# Patient Record
Sex: FEMALE | Race: BLACK OR AFRICAN AMERICAN | Employment: OTHER | URBAN - METROPOLITAN AREA
[De-identification: names, ages, dates, MRNs, and addresses within clinical notes are randomized per-mention and may not be internally consistent; named-entity substitution may affect disease eponyms.]

---

## 2018-01-26 ENCOUNTER — OFFICE VISIT (OUTPATIENT)
Dept: NEUROLOGY | Age: 83
End: 2018-01-26

## 2018-01-26 VITALS — DIASTOLIC BLOOD PRESSURE: 55 MMHG | SYSTOLIC BLOOD PRESSURE: 90 MMHG | OXYGEN SATURATION: 97 % | HEART RATE: 61 BPM

## 2018-01-26 DIAGNOSIS — R53.1 LEFT-SIDED WEAKNESS: ICD-10-CM

## 2018-01-26 DIAGNOSIS — G93.40 ENCEPHALOPATHY ACUTE: Primary | ICD-10-CM

## 2018-01-26 DIAGNOSIS — G93.40 ENCEPHALOPATHY ACUTE: ICD-10-CM

## 2018-01-26 DIAGNOSIS — R41.3 MEMORY CHANGES: ICD-10-CM

## 2018-01-26 RX ORDER — OMEPRAZOLE 20 MG/1
CAPSULE, DELAYED RELEASE ORAL
Refills: 1 | COMMUNITY
Start: 2017-12-27

## 2018-01-26 RX ORDER — PRAVASTATIN SODIUM 20 MG/1
TABLET ORAL
Refills: 0 | COMMUNITY
Start: 2017-12-27

## 2018-01-26 RX ORDER — AMLODIPINE BESYLATE 10 MG/1
TABLET ORAL
Refills: 0 | COMMUNITY
Start: 2017-12-27

## 2018-01-26 RX ORDER — CARVEDILOL 25 MG/1
TABLET ORAL
Refills: 0 | COMMUNITY
Start: 2017-12-27

## 2018-01-26 RX ORDER — ASPIRIN 325 MG
325 TABLET ORAL DAILY
COMMUNITY

## 2018-01-26 RX ORDER — VALSARTAN 160 MG/1
TABLET ORAL
Refills: 0 | COMMUNITY
Start: 2018-01-11

## 2018-01-26 RX ORDER — CILOSTAZOL 50 MG/1
TABLET ORAL
Refills: 0 | COMMUNITY
Start: 2017-12-28

## 2018-01-26 RX ORDER — METFORMIN HYDROCHLORIDE 500 MG/1
TABLET ORAL
Refills: 0 | COMMUNITY
Start: 2017-12-27

## 2018-01-26 NOTE — MR AVS SNAPSHOT
[address] [place] [address] [address] [facility] [address] [place] [id number] [phone] Patient: Leonor MRN: [id number] WFL:[id number] Visit Information Date & Time Provider Department Dept. Phone Encounter #  
 1/26/2018  2:00 PM MD Eli Owusu Neurology [facility] [phone] [id number] Upcoming Health Maintenance Date Due DTaP/Tdap/Td series (1 - Tdap) 9/18/1949 ZOSTER VACCINE AGE 60> 7/18/1988 GLAUCOMA SCREENING Q2Y 9/18/1993 OSTEOPOROSIS SCREENING (DEXA) 9/18/1993 Pneumococcal 65+ Low/Medium Risk (1 of 2 - PCV13) 9/18/1993 MEDICARE YEARLY EXAM 9/18/1993 Influenza Age 5 to Adult 8/1/2017 Allergies as of 1/26/2018  Review Complete On: 1/26/2018 By: Adriane Salomon No Known Allergies Current Immunizations  Never Reviewed No immunizations on file. Not reviewed this visit You Were Diagnosed With   
  
 Codes Comments Encephalopathy acute    -  Primary ICD-10-CM: G93.40 ICD-9-CM: 348.30 Left-sided weakness     ICD-10-CM: R53.1 ICD-9-CM: 728.87 Memory changes     ICD-10-CM: R41.3 ICD-9-CM: 780.93 Vitals BP Pulse SpO2 Smoking Status 90/55 61 97% Never Smoker Your Updated Medication List  
  
   
This list is accurate as of: 1/26/18  2:46 PM.  Always use your most recent med list. amLODIPine 10 mg tablet Commonly known as:  NORVASC  
  
 aspirin 325 mg tablet Commonly known as:  ASPIRIN Take 325 mg by mouth daily. carvedilol 25 mg tablet Commonly known as:  COREG  
  
 cilostazol 50 mg tablet Commonly known as:  PLETAL  
take 1 tablet by mouth 30 MINUTES BEFORE OR 2 HOURS AFTER BREAKFA. ..  (REFER TO PRESCRIPTION NOTES). metFORMIN 500 mg tablet Commonly known as:  GLUCOPHAGE  
  
 omeprazole 20 mg capsule Commonly known as:  PRILOSEC  
  
 pravastatin 20 mg tablet Commonly known as:  PRAVACHOL  
  
 valsartan 160 mg tablet Commonly known as:  DIOVAN  
take 1 tablet by mouth once daily We Performed the Following VITAMIN B12 & FOLATE [01644 CPT(R)] To-Do List   
 01/27/2018 Imaging:  DUPLEX CAROTID BILATERAL AMB NEURO   
  
 01/27/2018 Neurology:  EEG   
  
 01/27/2018 Imaging:  MRI BRAIN WO CONT   
  
 01/27/2018 Lab:  TSH 3RD GENERATION Patient Instructions [address] What is a living will? A living will is a legal form you use to write down the kind of care you want at the end of your life. It is used by the health professionals who will treat you if you aren't able to decide for yourself. If you put your wishes in writing, your loved ones and others will know what kind of care you want. They won't need to guess. This can ease your mind and be helpful to others. A living will is not the same as an estate or property will. An estate will explains what you want to happen with your money and property after you die. Is a living will a legal document? A living will is a legal document. Each state has its own laws about living garcia. If you move to another state, make sure that your living will is legal in the state where you now live. Or you might use a universal form that has been approved by many states. This kind of form can sometimes be completed and stored online. Your electronic copy will then be available wherever you have a connection to the Internet. In most cases, doctors will respect your wishes even if you have a form from a different state. · You don't need an  to complete a living will. But legal advice can be helpful if your state's laws are unclear, your health history is complicated, or your family can't agree on what should be in your living will. · You can change your living will at any time. Some people find that their wishes about end-of-life care change as their health changes. · In addition to making a living will, think about completing a medical power of  form. This form lets you name the person you want to make end-of-life treatment decisions for you (your \"health care agent\") if you're not able to. Many hospitals and nursing homes will give you the forms you need to complete a living will and a medical power of . · Your living will is used only if you can't make or communicate decisions for yourself anymore. If you become able to make decisions again, you can accept or refuse any treatment, no matter what you wrote in your living will. · Your state may offer an online registry. This is a place where you can store your living will online so the doctors and nurses who need to treat you can find it right away. What should you think about when creating a living will? Talk about your end-of-life wishes with your family members and your doctor. Let them know what you want. That way the people making decisions for you won't be surprised by your choices. Think about these questions as you make your living will: · Do you know enough about life support methods that might be used? If not, talk to your doctor so you know what might be done if you can't breathe on your own, your heart stops, or you're unable to swallow. · What things would you still want to be able to do after you receive life-support methods? Would you want to be able to walk? To speak? To eat on your own? To live without the help of machines? · If you have a choice, where do you want to be cared for? In your home? At a hospital or nursing home? · Do you want certain Islam practices performed if you become very ill? · If you have a choice at the end of your life, where would you prefer to die? At home? In a hospital or nursing home? Somewhere else? · Would you prefer to be buried or cremated? · Do you want your organs to be donated after you die? What should you do with your living will? · Make sure that your family members and your health care agent have copies of your living will. · Give your doctor a copy of your living will to keep in your medical record. If you have more than one doctor, make sure that each one has a copy. · You may want to put a copy of your living will where it can be easily found. Where can you learn more? Go to http://pepe-irene.info/. Enter N917 in the search box to learn more about \"Learning About Living Mark Santiago. \" Current as of: September 24, 2016 Content Version: 11.4 © 1964-1363 [employer]. Care instructions adapted under license by [employer] (which disclaims liability or warranty for this information). If you have questions about a medical condition or this instruction, always ask your healthcare professional. [address] any warranty or liability for your use of this information. PRESCRIPTION REFILL POLICY [facility] Statement to Patients April 1, 2014 In an effort to ensure the large volume of patient prescription refills is processed in the most efficient and expeditious manner, we are asking our patients to assist us by calling your Pharmacy for all prescription refills, this will include also your  Mail Order Pharmacy. The pharmacy will contact our office electronically to continue the refill process. Please do not wait until the last minute to call your pharmacy. We need at least 48 hours (2days) to fill prescriptions. We also encourage you to call your pharmacy before going to  your prescription to make sure it is ready. With regard to controlled substance prescription refill requests (narcotic refills) that need to be picked up at our office, we ask your cooperation by providing us with at least 72 hours (3days) notice that you will need a refill.  
 
We will not refill narcotic prescription refill requests after 4:00pm on any weekday, Monday through Thursday, or after 2:00pm on Fridays, or on the weekends. We encourage everyone to explore another way of getting your prescription refill request processed using LiveLeaf, our patient web portal through our electronic medical record system. Truevisiont is an efficient and effective way to communicate your medication request directly to the office and  downloadable as an jimbo on your smart phone . LiveLeaf also features a review functionality that allows you to view your medication list as well as leave messages for your physician. Are you ready to get connected? If so please review the attatched instructions or speak to any of our staff to get you set up right away! Thank you so much for your cooperation. Should you have any questions please contact our Practice Administrator. The Physicians and Staff,  [facility] If we have ordered testing for you, we typically do not call patients with results. Your doctor or nurse will contact you if there are critical results that need to be addressed before your next appointment. We also schedule follow up appointments so that your results can be discussed in person and any questions you have regarding them may be addressed. Additionally, results may be found by using the My Chart feature and one of our patient service representatives at the  can give you instructions on how to access this feature of our electronic medical record system. Introducing [facility]! [employer] introduces LiveLeaf patient portal. Now you can access parts of your medical record, email your doctor's office, and request medication refills online. 1. In your internet browser, go to [email or web address] 2. Click on the First Time User? Click Here link in the Sign In box. You will see the New Member Sign Up page. 3. Enter your LiveLeaf Access Code exactly as it appears below.  You will not need to use this code after youve completed the sign-up process. If you do not sign up before the expiration date, you must request a new code. · Educerus Access Code: [email or web address] Expires: 4/24/2018 10:06 AM 
 
4. Enter the last four digits of your Social Security Number (xxxx) and Date of Birth (mm/dd/yyyy) as indicated and click Submit. You will be taken to the next sign-up page. 5. Create a Educerus ID. This will be your Educerus login ID and cannot be changed, so think of one that is secure and easy to remember. 6. Create a Educerus password. You can change your password at any time. 7. Enter your Password Reset Question and Answer. This can be used at a later time if you forget your password. 8. Enter your e-mail address. You will receive e-mail notification when new information is available in [address]. 9. Click Sign Up. You can now view and download portions of your medical record. 10. Click the Download Summary menu link to download a portable copy of your medical information. If you have questions, please visit the Frequently Asked Questions section of the Educerus website. Remember, Educerus is NOT to be used for urgent needs. For medical emergencies, dial 911. Now available from your iPhone and Android! Please provide this summary of care documentation to your next provider. If you have any questions after today's visit, please call [phone].

## 2018-01-26 NOTE — PROGRESS NOTES
NEUROLOGY NEW PATIENT CONSULTATION    REFERRED BY:  No primary care provider on file. CHIEF COMPLAINT:  confusion    HISTORY OF PRESENT ILLNESS    HISTORY PROVIDED BY:  Patient  Family Member: daughters    Blake Swanson is a 80 y.o. female who I am asked to see in consultation for confusion. Patient was recently hospitalized for pneumonia at the beginning of January. At this time she started to exhibit some confusion. She was discharged to home and the confusion progressed. She was repeating herself over and over again. She would try to undress herself. She knew the date and other orientation questions. She was talking to God but no other hallucinations. She was taken back to the hospital twice. She was evaluated for the confusion with an MRI of the brain. This was negative. She was then discharged to home. She is here today for neurology follow-up of her confusion. Since the patient has been back home, she does seem to be improving in terms of her mental status. Prior to this event she did not have any memory issues. She has seven kids. Her son lives with her. Her daughter helps care for her as well. She never had shaking in the hand. However, since the hospital, she has had a tremor on the left hand while resting. She was not sleeping at all during the event. This has regulated. Her only med changes was to stop the valsartan with the diuretic. She is now just on valsartan. They were concerned the part of her confusion was secondary to dehydration. She was very weak after this event. She is supposed to start home health but this has not begun yet. She did have a stroke in the past. She is on statin and ASA.        [facility]  Past Medical History:   Diagnosis Date    Diabetes ([facility]     Hypertension     Stroke ([facility]          Social History     Social History    Marital status:      Spouse name: N/A    Number of children: N/A    Years of education: N/A     Social History Main Topics    Smoking status: Never Smoker    Smokeless tobacco: Never Used    Alcohol use Not on file    Drug use: Not on file    Sexual activity: Not on file     Other Topics Concern    Not on file     Social History Narrative    No narrative on file       FH  No family history of dementia    ALLERGIES  No Known Allergies    CURRENT MEDS  Current Outpatient Prescriptions   Medication Sig Dispense Refill    valsartan (DIOVAN) 160 mg tablet take 1 tablet by mouth once daily  0    carvedilol (COREG) 25 mg tablet   0    metFORMIN (GLUCOPHAGE) 500 mg tablet   0    amLODIPine (NORVASC) 10 mg tablet   0    pravastatin (PRAVACHOL) 20 mg tablet   0    omeprazole (PRILOSEC) 20 mg capsule   1    cilostazol (PLETAL) 50 mg tablet take 1 tablet by mouth 30 MINUTES BEFORE OR 2 HOURS AFTER BREAKFA. ..  (REFER TO PRESCRIPTION NOTES). 0    aspirin (ASPIRIN) 325 mg tablet Take 325 mg by mouth daily.          REVIEW OF SYSTEMS:     Y  N       Y  N  Y  N   Y  N  [] [x] AIDS          [] [x] Falls  [x] [] Memory Loss  [] [x]  Shortness of breath  [] [x] Anxiety          [] [x] Fatigue [] [x] Muscle Pain        [] [x]  Skipped beats  [] [x] Chest Pain   [] [x] Frequent HA [] [x] Ms Weakness     [] [x]  Snoring  [] [x] Constipation [] [x]Hearing loss [] [x] Nause/Vomiting  [] [x]  Stomach Pain  [] [x] Cough          [] [x]Hepatitis [] [x] Neuropathy         [] [x]  Swallowing difficulty  [] [x] Depression  [] [x]Incontinence [] [x] Poor appetite      [] [x]  Vertigo  [] [x] Diarrhea       [] [x] Joint Pain [] [x] Rash                   [] [x]  Visual disturbances  [] [x] Fainting        [] [x] Leg Swelling [] [x] Ringing ears       [] [x]  Weight changes      []Unable to obtain  ROS due to  []mental status change  []sedated   []intubated          PREVIOUS WORKUP  IMAGING: MRI of the brain normal per family      LABS  Results for orders placed or performed in visit on 01/26/18   VITAMIN B12 & FOLATE   Result Value Ref Range    Vitamin B12 429 232 - 1245 pg/mL    Folate 11.3 >3.0 ng/mL   TSH 3RD GENERATION   Result Value Ref Range    TSH 1.920 0.450 - 4.500 uIU/mL       PHYSICAL EXAM  Visit Vitals    BP 90/55    Pulse 61    SpO2 97%     General:  Alert, cooperative, no distress. Head:  Normocephalic, without obvious abnormality, atraumatic. Eyes:  Conjunctivae/corneas clear. Pupils equal, round, reactive to light. Extraocular movements intact, VFF, NO papilledema   Lungs:  Heart:   Non labored breathing  Regular rate and rhythm, no carotid bruits   Abdomen:   Soft, non-distended   Extremities: Extremities normal, atraumatic, no cyanosis or edema. Pulses: 2+ and symmetric all extremities. Skin: Skin color, texture, turgor normal. No rashes or lesions. Neurologic:  Gen: Attention normal             Language: naming, repetition, fluency normal             Memory: Alert and oriented ×3, 3/3 word recall resting  Cranial Nerves:  I: smell Not tested   II: visual fields Full to confrontation   II: pupils Equal, round, reactive to light   II: optic disc No papilledema   III,VII: ptosis none   III,IV,VI: extraocular muscles  Full ROM   V: mastication normal   V: facial light touch sensation  normal   VII: facial muscle function   symmetric   VIII: hearing symmetric   IX: soft palate elevation  normal   XI: trapezius strength  5/5   XI: sternocleidomastoid strength 5/5   XI: neck flexion strength  5/5   XII: tongue  midline     Motor: normal bulk and tone, resting of tremor left hand              Strength: 5/5 on the right, 4/5 on the left  Sensory: intact to LT ×4  Coordination: Deferred  Gait: Leans to the right (family has her in a wheelchair for now)  Reflexes: 2+ throughout       [address] Kuo is a 80 y.o. female who presents for evaluation of confusion. This is improved. I suspect patient had delirium secondary to acute infection. She has no underlying history of memory issues.   On exam today she seems to be at her baseline. Family is still concerned given that she seems to be weaker on one side and has tremor that stroke is a possibility. Will evaluate for this again. RECOMMENDATIONS  1. MRI of the brain without contrast  2. EEG  3. Carotids  4. Check B12 and TSH  5. Referral to home health for PT and OT  6. Continue aspirin and statin for stroke prevention    FU 2-3 weeks to review testing    Coral Mohs, MD    CC: No primary care provider on file. Fax: None    This note was created using voice recognition software. Despite editing, there may be syntax errors. This note will not be viewable in [address]

## 2018-01-26 NOTE — PATIENT INSTRUCTIONS
Learning About Living Toño  What is a living will? A living will is a legal form you use to write down the kind of care you want at the end of your life. It is used by the health professionals who will treat you if you aren't able to decide for yourself. If you put your wishes in writing, your loved ones and others will know what kind of care you want. They won't need to guess. This can ease your mind and be helpful to others. A living will is not the same as an estate or property will. An estate will explains what you want to happen with your money and property after you die. Is a living will a legal document? A living will is a legal document. Each state has its own laws about living garcia. If you move to another state, make sure that your living will is legal in the state where you now live. Or you might use a universal form that has been approved by many states. This kind of form can sometimes be completed and stored online. Your electronic copy will then be available wherever you have a connection to the Internet. In most cases, doctors will respect your wishes even if you have a form from a different state. · You don't need an  to complete a living will. But legal advice can be helpful if your state's laws are unclear, your health history is complicated, or your family can't agree on what should be in your living will. · You can change your living will at any time. Some people find that their wishes about end-of-life care change as their health changes. · In addition to making a living will, think about completing a medical power of  form. This form lets you name the person you want to make end-of-life treatment decisions for you (your \"health care agent\") if you're not able to. Many hospitals and nursing homes will give you the forms you need to complete a living will and a medical power of .   · Your living will is used only if you can't make or communicate decisions for yourself anymore. If you become able to make decisions again, you can accept or refuse any treatment, no matter what you wrote in your living will. · Your state may offer an online registry. This is a place where you can store your living will online so the doctors and nurses who need to treat you can find it right away. What should you think about when creating a living will? Talk about your end-of-life wishes with your family members and your doctor. Let them know what you want. That way the people making decisions for you won't be surprised by your choices. Think about these questions as you make your living will:  · Do you know enough about life support methods that might be used? If not, talk to your doctor so you know what might be done if you can't breathe on your own, your heart stops, or you're unable to swallow. · What things would you still want to be able to do after you receive life-support methods? Would you want to be able to walk? To speak? To eat on your own? To live without the help of machines? · If you have a choice, where do you want to be cared for? In your home? At a hospital or nursing home? · Do you want certain Moravian practices performed if you become very ill? · If you have a choice at the end of your life, where would you prefer to die? At home? In a hospital or nursing home? Somewhere else? · Would you prefer to be buried or cremated? · Do you want your organs to be donated after you die? What should you do with your living will? · Make sure that your family members and your health care agent have copies of your living will. · Give your doctor a copy of your living will to keep in your medical record. If you have more than one doctor, make sure that each one has a copy. · You may want to put a copy of your living will where it can be easily found. Where can you learn more? Go to http://pepe-irene.info/.   Enter K719 in the search box to learn more about \"Learning About Living Toño. \"  Current as of: September 24, 2016  Content Version: 11.4  © 6575-7326 Healthwise, Incorporated. Care instructions adapted under license by [employer] (which disclaims liability or warranty for this information). If you have questions about a medical condition or this instruction, always ask your healthcare professional. [address] any warranty or liability for your use of this information. 10 [facility]   Statement to Patients  April 1, 2014      In an effort to ensure the large volume of patient prescription refills is processed in the most efficient and expeditious manner, we are asking our patients to assist us by calling your Pharmacy for all prescription refills, this will include also your  Mail Order Pharmacy. The pharmacy will contact our office electronically to continue the refill process. Please do not wait until the last minute to call your pharmacy. We need at least 48 hours (2days) to fill prescriptions. We also encourage you to call your pharmacy before going to  your prescription to make sure it is ready. With regard to controlled substance prescription refill requests (narcotic refills) that need to be picked up at our office, we ask your cooperation by providing us with at least 72 hours (3days) notice that you will need a refill. We will not refill narcotic prescription refill requests after 4:00pm on any weekday, Monday through Thursday, or after 2:00pm on Fridays, or on the weekends. We encourage everyone to explore another way of getting your prescription refill request processed using [employer], our patient web portal through our electronic medical record system. [employer] is an efficient and effective way to communicate your medication request directly to the office and  downloadable as an jimbo on your smart phone .  [employer] also features a review functionality that allows you to view your medication list as well as leave messages for your physician. Are you ready to get connected? If so please review the attatched instructions or speak to any of our staff to get you set up right away! Thank you so much for your cooperation. Should you have any questions please contact our Practice Administrator. The Physicians and Staff,  [facility]     If we have ordered testing for you, we typically do not call patients with results. Your doctor or nurse will contact you if there are critical results that need to be addressed before your next appointment. We also schedule follow up appointments so that your results can be discussed in person and any questions you have regarding them may be addressed. Additionally, results may be found by using the My Chart feature and one of our patient service representatives at the  can give you instructions on how to access this feature of our electronic medical record system.

## 2018-01-27 DIAGNOSIS — G93.40 ENCEPHALOPATHY ACUTE: ICD-10-CM

## 2018-01-27 DIAGNOSIS — R53.1 LEFT-SIDED WEAKNESS: ICD-10-CM

## 2018-01-27 LAB
FOLATE SERPL-MCNC: 11.3 NG/ML
TSH SERPL DL<=0.005 MIU/L-ACNC: 1.92 UIU/ML (ref 0.45–4.5)
VIT B12 SERPL-MCNC: 429 PG/ML (ref 232–1245)

## 2018-01-28 NOTE — PROCEDURES
Carotid Doppler:     Date:  01/26/18    Requesting Physician:  Dr. Shannon Packer     Indication:  Acute encephalopathy with left sided weakness, question stroke, evaluate for stenosis. B-mode imaging reveals mild mixed plaquing at the bifurcations extending into the internal carotid artery segments proximally bilaterally. Doppler spectral analysis reveals no significantly elevated velocities. Vertebral artery flow antegrade bilaterally. Interpretation:  Plaque as noted. Stenosis estimated at 16-49% bilateral ICA.

## 2018-02-23 ENCOUNTER — OFFICE VISIT (OUTPATIENT)
Dept: NEUROLOGY | Age: 83
End: 2018-02-23

## 2018-02-23 VITALS
WEIGHT: 113 LBS | SYSTOLIC BLOOD PRESSURE: 106 MMHG | DIASTOLIC BLOOD PRESSURE: 62 MMHG | BODY MASS INDEX: 20.02 KG/M2 | HEIGHT: 63 IN

## 2018-02-23 DIAGNOSIS — R41.0 CONFUSION: Primary | ICD-10-CM

## 2018-02-23 NOTE — PROGRESS NOTES
Joann Lopez is a 80 y.o. female who presents with the following  Chief Complaint   Patient presents with    Follow-up     confusion       HPI    Patient comes in for a follow up for testing to evaluate for sources of confusion. They feel like things have gotten completely better. She was admitted to [address] [place] [address] for pneumonia and had some hallucinations and confusion in the hospital and a few days after. Today she feels good. They feel like she is back to normal.     No Known Allergies    Current Outpatient Prescriptions   Medication Sig    valsartan (DIOVAN) 160 mg tablet take 1 tablet by mouth once daily    carvedilol (COREG) 25 mg tablet     metFORMIN (GLUCOPHAGE) 500 mg tablet     amLODIPine (NORVASC) 10 mg tablet     pravastatin (PRAVACHOL) 20 mg tablet     omeprazole (PRILOSEC) 20 mg capsule     cilostazol (PLETAL) 50 mg tablet take 1 tablet by mouth 30 MINUTES BEFORE OR 2 HOURS AFTER BREAKFA. ..  (REFER TO PRESCRIPTION NOTES).  aspirin (ASPIRIN) 325 mg tablet Take 325 mg by mouth daily. No current facility-administered medications for this visit. History   Smoking Status    Never Smoker   Smokeless Tobacco    Never Used       Past Medical History:   Diagnosis Date    Diabetes ([address]     Hypertension     Stroke [facility])        History reviewed. No pertinent surgical history. History reviewed. No pertinent family history. Social History     Social History    Marital status:      Spouse name: N/A    Number of children: N/A    Years of education: N/A     Social History Main Topics    Smoking status: Never Smoker    Smokeless tobacco: Never Used    Alcohol use None    Drug use: None    Sexual activity: Not Asked     Other Topics Concern    None     Social History Narrative       Review of Systems   Psychiatric/Behavioral: Negative for hallucinations and memory loss. The patient is not nervous/anxious and does not have insomnia.         Remainder of comprehensive review is negative. Physical Exam :    Visit Vitals    /62    Ht 5' 3\" (1.6 m)    Wt 51.3 kg (113 lb)    BMI 20.02 kg/m2             Results for orders placed or performed in visit on 01/26/18   VITAMIN B12 & FOLATE   Result Value Ref Range    Vitamin B12 429 232 - 1245 pg/mL    Folate 11.3 >3.0 ng/mL   TSH 3RD GENERATION   Result Value Ref Range    TSH 1.920 0.450 - 4.500 uIU/mL       No orders of the defined types were placed in this encounter. No diagnosis found. Follow-up Disposition:  Return if symptoms worsen or fail to improve. Thyroid and Vitamin b12 normal. Carotid doppler normal. She will continue to monitor her symptoms along with her family watching her mental status. She will call with any changes. Do not want to pursue EEG. Stay active.          This note will not be viewable in [employer]

## 2018-02-23 NOTE — MR AVS SNAPSHOT
[address] [place] [address] [address] [facility] [address] [phone] Patient: Leonor MRN: [id number] [facility]:[birth year] Visit Information Date & Time Provider Department Dept. Phone Encounter #  
 2/23/2018  9:00 AM Betsy Serna NP [facility] [phone] [id number] Follow-up Instructions Return if symptoms worsen or fail to improve. Upcoming Health Maintenance Date Due DTaP/Tdap/Td series (1 - Tdap) 9/18/1949 ZOSTER VACCINE AGE 60> 7/18/1988 GLAUCOMA SCREENING Q2Y 9/18/1993 OSTEOPOROSIS SCREENING (DEXA) 9/18/1993 Pneumococcal 65+ Low/Medium Risk (1 of 2 - PCV13) 9/18/1993 MEDICARE YEARLY EXAM 9/18/1993 Influenza Age 5 to Adult 8/1/2017 Allergies as of 2/23/2018  Review Complete On: 2/23/2018 By: Phoebe Gonzalez No Known Allergies Current Immunizations  Never Reviewed No immunizations on file. Not reviewed this visit Vitals BP Height(growth percentile) Weight(growth percentile) BMI Smoking Status 106/62 5' 3\" (1.6 m) 113 lb (51.3 kg) 20.02 kg/m2 Never Smoker Vitals History BMI and BSA Data Body Mass Index Body Surface Area 20.02 kg/m 2 1.51 m 2 Your Updated Medication List  
  
   
This list is accurate as of 2/23/18  9:21 AM.  Always use your most recent med list. amLODIPine 10 mg tablet Commonly known as:  NORVASC  
  
 aspirin 325 mg tablet Commonly known as:  ASPIRIN Take 325 mg by mouth daily. carvedilol 25 mg tablet Commonly known as:  COREG  
  
 cilostazol 50 mg tablet Commonly known as:  PLETAL  
take 1 tablet by mouth 30 MINUTES BEFORE OR 2 HOURS AFTER BREAKFA. ..  (REFER TO PRESCRIPTION NOTES). metFORMIN 500 mg tablet Commonly known as:  GLUCOPHAGE  
  
 omeprazole 20 mg capsule Commonly known as:  PRILOSEC  
  
 pravastatin 20 mg tablet Commonly known as:  PRAVACHOL  
  
 valsartan 160 mg tablet Commonly known as:  DIOVAN  
take 1 tablet by mouth once daily Follow-up Instructions Return if symptoms worsen or fail to improve. Patient Instructions PRESCRIPTION REFILL POLICY [facility] Statement to Patients April 1, 2014 In an effort to ensure the large volume of patient prescription refills is processed in the most efficient and expeditious manner, we are asking our patients to assist us by calling your Pharmacy for all prescription refills, this will include also your  Mail Order Pharmacy. The pharmacy will contact our office electronically to continue the refill process. Please do not wait until the last minute to call your pharmacy. We need at least 48 hours (2days) to fill prescriptions. We also encourage you to call your pharmacy before going to  your prescription to make sure it is ready. With regard to controlled substance prescription refill requests (narcotic refills) that need to be picked up at our office, we ask your cooperation by providing us with at least 72 hours (3days) notice that you will need a refill. We will not refill narcotic prescription refill requests after 4:00pm on any weekday, Monday through Thursday, or after 2:00pm on Fridays, or on the weekends. We encourage everyone to explore another way of getting your prescription refill request processed using [employer], our patient web portal through our electronic medical record system. [employer] is an efficient and effective way to communicate your medication request directly to the office and  downloadable as an jimbo on your smart phone . [employer] also features a review functionality that allows you to view your medication list as well as leave messages for your physician. Are you ready to get connected?  If so please review the attatched instructions or speak to any of our staff to get you set up right away! Thank you so much for your cooperation. Should you have any questions please contact our Practice Administrator. The Physicians and Staff,  [facility] Introducing [facility] SERVICES! [facility] introduces [employer] patient portal. Now you can access parts of your medical record, email your doctor's office, and request medication refills online. 1. In your internet browser, go to [email or web address] 2. Click on the First Time User? Click Here link in the Sign In box. You will see the New Member Sign Up page. 3. Enter your [employer] Access Code exactly as it appears below. You will not need to use this code after youve completed the sign-up process. If you do not sign up before the expiration date, you must request a new code. · [employer] Access Code: [email or web address] Expires: 4/24/2018 10:06 AM 
 
4. Enter the last four digits of your Social Security Number (xxxx) and Date of Birth (mm/dd/yyyy) as indicated and click Submit. You will be taken to the next sign-up page. 5. Create a [employer]EXANCE MD ID. This will be your [employer] login ID and cannot be changed, so think of one that is secure and easy to remember. 6. Create a [employer] password. You can change your password at any time. 7. Enter your Password Reset Question and Answer. This can be used at a later time if you forget your password. 8. Enter your e-mail address. You will receive e-mail notification when new information is available in [address] 9. Click Sign Up. You can now view and download portions of your medical record. 10. Click the Download Summary menu link to download a portable copy of your medical information. If you have questions, please visit the Frequently Asked Questions section of the [employer] website. Remember, [employer] is NOT to be used for urgent needs. For medical emergencies, dial 911. Now available from your iPhone and Android! Please provide this summary of care documentation to your next provider. If you have any questions after today's visit, please call [phone].

## 2018-02-23 NOTE — PATIENT INSTRUCTIONS
10 [facility]   Statement to Patients  April 1, 2014      In an effort to ensure the large volume of patient prescription refills is processed in the most efficient and expeditious manner, we are asking our patients to assist us by calling your Pharmacy for all prescription refills, this will include also your  Mail Order Pharmacy. The pharmacy will contact our office electronically to continue the refill process. Please do not wait until the last minute to call your pharmacy. We need at least 48 hours (2days) to fill prescriptions. We also encourage you to call your pharmacy before going to  your prescription to make sure it is ready. With regard to controlled substance prescription refill requests (narcotic refills) that need to be picked up at our office, we ask your cooperation by providing us with at least 72 hours (3days) notice that you will need a refill. We will not refill narcotic prescription refill requests after 4:00pm on any weekday, Monday through Thursday, or after 2:00pm on Fridays, or on the weekends. We encourage everyone to explore another way of getting your prescription refill request processed using [employer], our patient web portal through our electronic medical record system. [employer] is an efficient and effective way to communicate your medication request directly to the office and  downloadable as an jimbo on your smart phone . [employer] also features a review functionality that allows you to view your medication list as well as leave messages for your physician. Are you ready to get connected? If so please review the attatched instructions or speak to any of our staff to get you set up right away! Thank you so much for your cooperation. Should you have any questions please contact our Practice Administrator.     The Physicians and Staff,  [facility]

## 2018-04-04 ENCOUNTER — ANESTHESIA EVENT (OUTPATIENT)
Dept: ENDOSCOPY | Age: 83
End: 2018-04-04
Payer: MEDICARE

## 2018-04-04 ENCOUNTER — HOSPITAL ENCOUNTER (OUTPATIENT)
Age: 83
Setting detail: OUTPATIENT SURGERY
Discharge: HOME | End: 2018-04-04
Attending: INTERNAL MEDICINE | Admitting: INTERNAL MEDICINE
Payer: MEDICARE

## 2018-04-04 ENCOUNTER — ANESTHESIA (OUTPATIENT)
Dept: ENDOSCOPY | Age: 83
End: 2018-04-04
Payer: MEDICARE

## 2018-04-04 VITALS
HEIGHT: 63 IN | HEART RATE: 65 BPM | DIASTOLIC BLOOD PRESSURE: 77 MMHG | BODY MASS INDEX: 18.46 KG/M2 | OXYGEN SATURATION: 98 % | SYSTOLIC BLOOD PRESSURE: 140 MMHG | RESPIRATION RATE: 18 BRPM | TEMPERATURE: 98.2 F | WEIGHT: 104.19 LBS

## 2018-04-04 LAB
GLUCOSE BLD STRIP.AUTO-MCNC: 95 MG/DL (ref 65–100)
SERVICE CMNT-IMP: NORMAL

## 2018-04-04 PROCEDURE — 74011250636 HC RX REV CODE- 250/636

## 2018-04-04 PROCEDURE — 74011250636 HC RX REV CODE- 250/636: Performed by: INTERNAL MEDICINE

## 2018-04-04 PROCEDURE — 82962 GLUCOSE BLOOD TEST: CPT

## 2018-04-04 PROCEDURE — 76060000031 HC ANESTHESIA FIRST 0.5 HR: Performed by: INTERNAL MEDICINE

## 2018-04-04 PROCEDURE — 74011000250 HC RX REV CODE- 250

## 2018-04-04 PROCEDURE — 76040000019: Performed by: INTERNAL MEDICINE

## 2018-04-04 RX ORDER — MIDAZOLAM HYDROCHLORIDE 1 MG/ML
.25-5 INJECTION, SOLUTION INTRAMUSCULAR; INTRAVENOUS
Status: DISCONTINUED | OUTPATIENT
Start: 2018-04-04 | End: 2018-04-04 | Stop reason: HOSPADM

## 2018-04-04 RX ORDER — SODIUM CHLORIDE 9 MG/ML
INJECTION, SOLUTION INTRAVENOUS
Status: DISCONTINUED | OUTPATIENT
Start: 2018-04-04 | End: 2018-04-04 | Stop reason: HOSPADM

## 2018-04-04 RX ORDER — LIDOCAINE HYDROCHLORIDE 20 MG/ML
INJECTION, SOLUTION EPIDURAL; INFILTRATION; INTRACAUDAL; PERINEURAL AS NEEDED
Status: DISCONTINUED | OUTPATIENT
Start: 2018-04-04 | End: 2018-04-04 | Stop reason: HOSPADM

## 2018-04-04 RX ORDER — PROPOFOL 10 MG/ML
INJECTION, EMULSION INTRAVENOUS AS NEEDED
Status: DISCONTINUED | OUTPATIENT
Start: 2018-04-04 | End: 2018-04-04 | Stop reason: HOSPADM

## 2018-04-04 RX ORDER — EPINEPHRINE 0.1 MG/ML
1 INJECTION INTRACARDIAC; INTRAVENOUS
Status: DISCONTINUED | OUTPATIENT
Start: 2018-04-04 | End: 2018-04-04 | Stop reason: HOSPADM

## 2018-04-04 RX ORDER — DEXTROSE 50 % IN WATER (D50W) INTRAVENOUS SYRINGE
Status: DISCONTINUED
Start: 2018-04-04 | End: 2018-04-04 | Stop reason: HOSPADM

## 2018-04-04 RX ORDER — FLUMAZENIL 0.1 MG/ML
0.2 INJECTION INTRAVENOUS
Status: DISCONTINUED | OUTPATIENT
Start: 2018-04-04 | End: 2018-04-04 | Stop reason: HOSPADM

## 2018-04-04 RX ORDER — DEXTROMETHORPHAN/PSEUDOEPHED 2.5-7.5/.8
1.2 DROPS ORAL
Status: DISCONTINUED | OUTPATIENT
Start: 2018-04-04 | End: 2018-04-04 | Stop reason: HOSPADM

## 2018-04-04 RX ORDER — SODIUM CHLORIDE 0.9 % (FLUSH) 0.9 %
5-10 SYRINGE (ML) INJECTION AS NEEDED
Status: DISCONTINUED | OUTPATIENT
Start: 2018-04-04 | End: 2018-04-04 | Stop reason: HOSPADM

## 2018-04-04 RX ORDER — SODIUM CHLORIDE 9 MG/ML
50 INJECTION, SOLUTION INTRAVENOUS CONTINUOUS
Status: DISCONTINUED | OUTPATIENT
Start: 2018-04-04 | End: 2018-04-04 | Stop reason: HOSPADM

## 2018-04-04 RX ORDER — SODIUM CHLORIDE 0.9 % (FLUSH) 0.9 %
5-10 SYRINGE (ML) INJECTION EVERY 8 HOURS
Status: DISCONTINUED | OUTPATIENT
Start: 2018-04-04 | End: 2018-04-04 | Stop reason: HOSPADM

## 2018-04-04 RX ORDER — FENTANYL CITRATE 50 UG/ML
100 INJECTION, SOLUTION INTRAMUSCULAR; INTRAVENOUS
Status: DISCONTINUED | OUTPATIENT
Start: 2018-04-04 | End: 2018-04-04 | Stop reason: HOSPADM

## 2018-04-04 RX ORDER — NALOXONE HYDROCHLORIDE 0.4 MG/ML
0.4 INJECTION, SOLUTION INTRAMUSCULAR; INTRAVENOUS; SUBCUTANEOUS
Status: DISCONTINUED | OUTPATIENT
Start: 2018-04-04 | End: 2018-04-04 | Stop reason: HOSPADM

## 2018-04-04 RX ORDER — ATROPINE SULFATE 0.1 MG/ML
0.5 INJECTION INTRAVENOUS
Status: DISCONTINUED | OUTPATIENT
Start: 2018-04-04 | End: 2018-04-04 | Stop reason: HOSPADM

## 2018-04-04 RX ADMIN — PROPOFOL 20 MG: 10 INJECTION, EMULSION INTRAVENOUS at 12:10

## 2018-04-04 RX ADMIN — SODIUM CHLORIDE: 9 INJECTION, SOLUTION INTRAVENOUS at 11:38

## 2018-04-04 RX ADMIN — PROPOFOL 70 MG: 10 INJECTION, EMULSION INTRAVENOUS at 12:07

## 2018-04-04 RX ADMIN — LIDOCAINE HYDROCHLORIDE 20 MG: 20 INJECTION, SOLUTION EPIDURAL; INFILTRATION; INTRACAUDAL; PERINEURAL at 12:07

## 2018-04-04 NOTE — DISCHARGE INSTRUCTIONS
Jaclyn Vega  [id number]  9/18/1928    EGD DISCHARGE INSTRUCTIONS  Discomfort:  Sore throat- throat lozenges or warm salt water gargle  redness at IV site- apply warm compress to area; if redness or soreness persist- contact your physician  Gaseous discomfort- walking, belching will help relieve any discomfort  You may not operate a vehicle for 12 hours  You may not engage in an occupation involving machinery or appliances for rest of today  You may not drink alcoholic beverages for at least 12 hours  Avoid making any critical decisions for at least 24 hour  DIET  You may resume your regular diet. ACTIVITY  You may not resume your normal daily activities   Spend the remainder of the day resting -  avoid any strenuous activity. CALL M.D. ANY SIGN OF   Increasing pain, nausea, vomiting  Abdominal distension (swelling)  New increased bleeding (oral or rectal)  Fever (chills)  Pain in chest area  Bloody discharge from nose or mouth  Shortness of breath    You may not take any Advil, Aspirin, Ibuprofen, Motrin, Aleve, or Goodys for 10 days, ONLY  Tylenol as needed for pain. Resume Pletal in 3 days. Follow-up Instructions:   Call Dr. Tutu Monique [phone] if you have any concerns.     Appointment in days  Telephone #   Additional instructions                   Jaclyn Vega  [id number]  9/18/1928        DISCHARGE SUMMARY from Nurse    The following personal items collected during your admission are returned to you:   Dental Appliance: Dental Appliances: None  Vision: Visual Aid: None  Hearing Aid:    Jewelry:    Clothing:    Other Valuables:    Valuables sent to safe:      PATIENT INSTRUCTIONS:    Take Home Medicatio

## 2018-04-04 NOTE — IP AVS SNAPSHOT
Summary of Care Report The Summary of Care report has been created to help improve care coordination. Users with access to [employer] or [address] (Web-based application) may access additional patient information including the Discharge Summary. If you are not currently a [address] user and need more information, please call the number listed below in the [place] [address] section and ask to be connected with Medical Records. Facility Information Name Address Phone [address] [facility] [address] [phone] Patient Information Patient Name Sex HARSHA Segura ([id number]) Female 1928 Discharge Information Admitting Provider Service Area Unit Himanshu Lerma MD /  [facility],[address] Floor [facility] / [phone] Discharge Provider Discharge Date/Time Discharge Disposition Destination (none) (none) (none) (none) Patient Language Language ENGLISH [13] Hospital Problems as of 2018  Reviewed: 2018  8:12 AM by Duncan Jacob MD  
 None Non-Hospital Problems as of 2018  Reviewed: 2018  8:12 AM by Duncan Jacob MD  
 None You are allergic to the following No active allergies Current Discharge Medication List  
  
ASK your doctor about these medications Dose & Instructions Dispensing Information Comments  
 amLODIPine 10 mg tablet Commonly known as:  Ivelisse Pederson Refills:  0  
   
 aspirin 325 mg tablet Commonly known as:  ASPIRIN Dose:  325 mg Take 325 mg by mouth daily. Refills:  0  
   
 carvedilol 25 mg tablet Commonly known as:  Jermark Specter Refills:  0  
   
 cilostazol 50 mg tablet Commonly known as:  PLETAL  
 take 1 tablet by mouth 30 MINUTES BEFORE OR 2 HOURS AFTER BREAKFA. ..  (REFER TO PRESCRIPTION NOTES). Refills:  0  
   
 metFORMIN 500 mg tablet Commonly known as:  GLUCOPHAGE Refills:  0  
   
 omeprazole 20 mg capsule Commonly known as:  PRILOSEC Refills:  1  
   
 pravastatin 20 mg tablet Commonly known as:  PRAVACHOL Refills:  0  
   
 valsartan 160 mg tablet Commonly known as:  DIOVAN  
 take 1 tablet by mouth once daily Refills:  0 Surgery Information ID Date/Time Status Primary Surgeon All Procedures Location [id number] 4/4/2018 1130 Unposted Justin Diaz MD ESOPHAGOGASTRODUODENOSCOPY (EGD) ESOPHAGEAL DILATION [facility] ENDOSCOPY Follow-up Information None Discharge Instructions [facility] [id number] 9/18/1928 EGD DISCHARGE INSTRUCTIONS Discomfort: 
Sore throat- throat lozenges or warm salt water gargle 
redness at IV site- apply warm compress to area; if redness or soreness persist- contact your physician Gaseous discomfort- walking, belching will help relieve any discomfort You may not operate a vehicle for 12 hours You may not engage in an occupation involving machinery or appliances for rest of today You may not drink alcoholic beverages for at least 12 hours Avoid making any critical decisions for at least 24 hour DIET You may resume your regular diet. ACTIVITY You may not resume your normal daily activities Spend the remainder of the day resting -  avoid any strenuous activity. CALL M.D. ANY SIGN OF Increasing pain, nausea, vomiting Abdominal distension (swelling) New increased bleeding (oral or rectal) Fever (chills) Pain in chest area Bloody discharge from nose or mouth Shortness of breath You may not take any Advil, Aspirin, Ibuprofen, Motrin, Aleve, or Goodys for 10 days, ONLY  Tylenol as needed for pain. Resume Pletal in 3 days. Follow-up Instructions: 
 Call Dr. Wale Dillard [phone] if you have any concerns. Appointment in *** days Telephone #  *** Additional instructions *** [facility] [id number] 9/18/1928 DISCHARGE SUMMARY from Nurse The following personal items collected during your admission are returned to you:  
Dental Appliance: Dental Appliances: None Vision: Visual Aid: None Hearing Aid:   
Jewelry:   
Clothing:   
Other Valuables:   
Valuables sent to safe:   
 
PATIENT INSTRUCTIONS: 
 
Take Home Medications: 
{Medication reconciliation information is now added to the patient's AVS automatically when it is printed. There is no need to use this SmartLink in discharge instructions. Highlight this text and delete it to clear this message} Chart Review Routing History No Routing History on File

## 2018-04-04 NOTE — H&P
Héctor 1980, [address] is a  80 y.o.  female who presents with abdominal and some dysphagia which has improved. .        Past Medical History:   Diagnosis Date    Arthritis     Diabetes ([facility] [address]     Hypertension     Stroke [facility])      History reviewed. No pertinent surgical history. No Known Allergies  Current Facility-Administered Medications   Medication Dose Route Frequency Provider Last Rate Last Dose    0.9% sodium chloride infusion  50 mL/hr IntraVENous CONTINUOUS Angelica Mera MD 50 mL/hr at 04/04/18 1141 50 mL/hr at 04/04/18 1141    sodium chloride (NS) flush 5-10 mL  5-10 mL IntraVENous Q8H Angelica Mera MD        sodium chloride (NS) flush 5-10 mL  5-10 mL IntraVENous PRN Angelica Mera MD        midazolam (VERSED) injection 0.25-5 mg  0.25-5 mg IntraVENous Multiple Angelica Mera MD        fentaNYL citrate (PF) injection 100 mcg  100 mcg IntraVENous Multiple Angelica Mera MD        naloxone [facility]) injection 0.4 mg  0.4 mg IntraVENous Multiple Angelica Mera MD        flumazenil (ROMAZICON) 0.1 mg/mL injection 0.2 mg  0.2 mg IntraVENous Multiple Angelica Mera MD        [facility]) 30CN/1.8WI oral drops 80 mg  1.2 mL Oral Multiple Angelica Mera MD        atropine injection 0.5 mg  0.5 mg IntraVENous ONCE PRN Angelica Mera MD        EPINEPHrine (ADRENALIN) 0.1 mg/mL syringe 1 mg  1 mg Endoscopically ONCE PRN Angelica Mera MD         Facility-Administered Medications Ordered in Other Encounters   Medication Dose Route Frequency Provider Last Rate Last Dose    0.9% sodium chloride infusion   IntraVENous CONTINUOUS Tony Doe CRNA           Visit Vitals    /73    Pulse 66    Temp 98.4 °F (36.9 °C)    Resp 19    Ht 5' 3\" (1.6 m)    Wt 47.3 kg (104 lb 3 oz)    SpO2 99%    Breastfeeding No    BMI 18.46 kg/m2           PHYSICAL EXAM:  General: WD, WN. Alert, cooperative, no acute distress    HEENT: NC, Atraumatic. PERRLA, EOMI. Anicteric sclerae. Mallampati score 2  Lungs:  CTA Bilaterally. No Wheezing/Rhonchi/Rales. Heart:  Regular  rhythm,  No murmur (), No Rubs, No Gallops  Abdomen: Soft, Non distended, Non tender.  +Bowel sounds, no HSM  Extremities: No c/c/e  Neurologic:  CN 2-12 gi, Alert and oriented X 3. No acute neurological distress   Psych:   Good insight. Not anxious nor agitated. Plan:   Endoscopic procedure with MAC.     Sedrick Beintez MD  4/4/2018  11:57 AM

## 2018-04-04 NOTE — PROGRESS NOTES
Endoscope was pre-cleaned at bedside immediately following procedure by CLAUDETTE DURON. Jono Sarabia

## 2018-04-04 NOTE — ANESTHESIA POSTPROCEDURE EVALUATION
Post-Anesthesia Evaluation and Assessment    Patient: Leidy Good MRN: [id number]  SSN: [id number]    YOB: 1928  Age: 80 y.o. Sex: female       Cardiovascular Function/Vital Signs  Visit Vitals    /61    Pulse (!) 58    Temp 36.8 °C (98.2 °F)    Resp 20    Ht 5' 3\" (1.6 m)    Wt 47.3 kg (104 lb 3 oz)    SpO2 98%    Breastfeeding No    BMI 18.46 kg/m2       Patient is status post MAC anesthesia for Procedure(s):  ESOPHAGOGASTRODUODENOSCOPY (EGD)  ESOPHAGEAL DILATION. Nausea/Vomiting: None    Postoperative hydration reviewed and adequate. Pain:  Pain Scale 1: Numeric (0 - 10) (04/04/18 1222)  Pain Intensity 1: 0 (04/04/18 1222)   Managed    Neurological Status: At baseline    Mental Status and Level of Consciousness: Arousable    Pulmonary Status:   O2 Device: Room air (04/04/18 1222)   Adequate oxygenation and airway patent    Complications related to anesthesia: None    Post-anesthesia assessment completed.  No concerns    Signed By: Rose Martinez MD     April 4, 2018

## 2018-04-04 NOTE — ANESTHESIA PREPROCEDURE EVALUATION
Anesthetic History   No history of anesthetic complications            Review of Systems / Medical History  Patient summary reviewed, nursing notes reviewed and pertinent labs reviewed    Pulmonary  Within defined limits                 Neuro/Psych   Within defined limits    CVA       Cardiovascular  Within defined limits  Hypertension                   GI/Hepatic/Renal  Within defined limits              Endo/Other  Within defined limits  Diabetes    Arthritis     Other Findings              Physical Exam    Airway  Mallampati: II  TM Distance: > 6 cm  Neck ROM: normal range of motion   Mouth opening: Normal     Cardiovascular  Regular rate and rhythm,  S1 and S2 normal,  no murmur, click, rub, or gallop             Dental    Dentition: Poor dentition     Pulmonary  Breath sounds clear to auscultation               Abdominal  GI exam deferred       Other Findings            Anesthetic Plan    ASA: 3  Anesthesia type: MAC          Induction: Intravenous  Anesthetic plan and risks discussed with: Patient

## 2018-04-04 NOTE — IP AVS SNAPSHOT
[address] 
[phone] Patient: Leonor MRN: [id number] AOZ:3/47/1988 About your hospitalization You were admitted on:  April 4, 2018 You last received care in the:  [facility] ENDOSCOPY You were discharged on:  April 4, 2018 Why you were hospitalized Your primary diagnosis was:  Not on File Follow-up Information None Discharge Orders None A check felix indicates which time of day the medication should be taken. My Medications ASK your doctor about these medications Instructions Each Dose to Equal  
 Morning Noon Evening Bedtime  
 amLODIPine 10 mg tablet Commonly known as:  Obdulia Autumn Your last dose was: Your next dose is:    
   
   
      
   
   
   
  
 aspirin 325 mg tablet Commonly known as:  ASPIRIN Your last dose was: Your next dose is: Take 325 mg by mouth daily. 325 mg  
    
   
   
   
  
 carvedilol 25 mg tablet Commonly known as:  Adebayo  Your last dose was: Your next dose is:    
   
   
      
   
   
   
  
 cilostazol 50 mg tablet Commonly known as:  PLETAL Your last dose was: Your next dose is:    
   
   
 take 1 tablet by mouth 30 MINUTES BEFORE OR 2 HOURS AFTER BREAKFA. ..  (REFER TO PRESCRIPTION NOTES). metFORMIN 500 mg tablet Commonly known as:  GLUCOPHAGE Your last dose was: Your next dose is:    
   
   
      
   
   
   
  
 omeprazole 20 mg capsule Commonly known as:  PRILOSEC Your last dose was: Your next dose is:    
   
   
      
   
   
   
  
 pravastatin 20 mg tablet Commonly known as:  PRAVACHOL Your last dose was: Your next dose is:    
   
   
      
   
   
   
  
 valsartan 160 mg tablet Commonly known as:  DIOVAN Your last dose was: Your next dose is: take 1 tablet by mouth once daily Discharge Instructions [facility] [id number] 9/18/1928 EGD DISCHARGE INSTRUCTIONS Discomfort: 
Sore throat- throat lozenges or warm salt water gargle 
redness at IV site- apply warm compress to area; if redness or soreness persist- contact your physician Gaseous discomfort- walking, belching will help relieve any discomfort You may not operate a vehicle for 12 hours You may not engage in an occupation involving machinery or appliances for rest of today You may not drink alcoholic beverages for at least 12 hours Avoid making any critical decisions for at least 24 hour DIET You may resume your regular diet. ACTIVITY You may not resume your normal daily activities Spend the remainder of the day resting -  avoid any strenuous activity. CALL M.D. ANY SIGN OF Increasing pain, nausea, vomiting Abdominal distension (swelling) New increased bleeding (oral or rectal) Fever (chills) Pain in chest area Bloody discharge from nose or mouth Shortness of breath You may not take any Advil, Aspirin, Ibuprofen, Motrin, Aleve, or Goodys for 10 days, ONLY  Tylenol as needed for pain. Resume Pletal in 3 days. Follow-up Instructions: 
 Call Dr. Marshall Chamorro [phone] if you have any concerns. Appointment in *** days Telephone #  *** Additional instructions *** [facility] [id number] 9/18/1928 DISCHARGE SUMMARY from Nurse The following personal items collected during your admission are returned to you:  
Dental Appliance: Dental Appliances: None Vision: Visual Aid: None Hearing Aid:   
Jewelry:   
Clothing:   
Other Valuables:   
Valuables sent to safe:   
 
PATIENT INSTRUCTIONS: 
 
Take Home Medications: 
{Medication reconciliation information is now added to the patient's AVS automatically when it is printed.   There is no need to use this SmartLink in discharge instructions. Highlight this text and delete it to clear this message} Introducing [facility]! [employer] [employer] introduces [employer] patient portal. Now you can access parts of your medical record, email your doctor's office, and request medication refills online. 1. In your internet browser, go to [email or web address] 2. Click on the First Time User? Click Here link in the Sign In box. You will see the New Member Sign Up page. 3. Enter your Lexity Access Code exactly as it appears below. You will not need to use this code after youve completed the sign-up process. If you do not sign up before the expiration date, you must request a new code. · Lex[employer] Access Code: [email or web address] Expires: 4/24/2018 11:06 AM 
 
4. Enter the last four digits of your Social Security Number (xxxx) and Date of Birth (mm/dd/yyyy) as indicated and click Submit. You will be taken to the next sign-up page. 5. Create a Lexity ID. This will be your Lexity login ID and cannot be changed, so think of one that is secure and easy to remember. 6. Create a Lexity password. You can change your password at any time. 7. Enter your Password Reset Question and Answer. This can be used at a later time if you forget your password. 8. Enter your e-mail address. You will receive e-mail notification when new information is available in [address] 9. Click Sign Up. You can now view and download portions of your medical record. 10. Click the Download Summary menu link to download a portable copy of your medical information. If you have questions, please visit the Frequently Asked Questions section of the Lexity website. Remember, Lexity is NOT to be used for urgent needs. For medical emergencies, dial 911. Now available from your iPhone and Android! Introducing Shyam George As a [facility] patient, I wanted to make you aware of our electronic visit tool called Shyam George. [employer] 24/7 allows you to connect within minutes with a medical provider 24 hours a day, seven days a week via a mobile device or tablet or logging into a secure website from your computer. You can access [employer] from anywhere in the [place]. A virtual visit might be right for you when you have a simple condition and feel like you just dont want to get out of bed, or cant get away from work for an appointment, when your regular [employer] provider is not available (evenings, weekends or holidays), or when youre out of town and need minor care. Electronic visits cost only $49 and if the [employer] 24/[employer] provider determines a prescription is needed to treat your condition, one can be electronically transmitted to a nearby pharmacy*. Please take a moment to enroll today if you have not already done so. The enrollment process is free and takes just a few minutes. To enroll, please download the [employer] 24/7 jimbo to your tablet or phone, or visit [email or web address] to enroll on your computer. And, as an [address] [place] [address] patient with a [employer] account, the results of your visits will be scanned into your electronic medical record and your primary care provider will be able to view the scanned results. We urge you to continue to see your regular [employer] provider for your ongoing medical care. And while your primary care provider may not be the one available when you seek a [employer] virtual visit, the peace of mind you get from getting a real diagnosis real time can be priceless. For more information on [employer], view our Frequently Asked Questions (FAQs) at [email or web address]. Sincerely, 
 
Vanessa Green MD 
Chief Medical Officer Alejandrina Weaver *:  certain medications cannot be prescribed via [employer] Providers Seen During Your Hospitalization Provider Specialty Primary office phone Bryant Veliz MD Gastroenterology [phone] Your Primary Care Physician (PCP) Primary Care Physician Office Phone Office Fax OTHER, PHYS ** None ** ** None ** You are allergic to the following No active allergies Recent Documentation Height Weight Breastfeeding? BMI OB Status Smoking Status 1.6 m 47.3 kg No 18.46 kg/m2 Postmenopausal Never Smoker Emergency Contacts Name Discharge Info Relation Home Work Mobile Ama Newman DISCHARGE CAREGIVER [3] Other Relative [6] [phone] Patient Belongings The following personal items are in your possession at time of discharge: 
  Dental Appliances: None  Visual Aid: None Please provide this summary of care documentation to your next provider. Signatures-by signing, you are acknowledging that this After Visit Summary has been reviewed with you and you have received a copy. Patient Signature:  ____________________________________________________________ Date:  ____________________________________________________________  
  
[facility] Provider Signature:  ____________________________________________________________ Date:  ____________________________________________________________

## 2018-04-04 NOTE — PROCEDURES
[address]  [address]               : Sue Nowak MD    Referring Provider: Anjali Steele MD    Sedation:  MAC anesthesia Propofol      Prior to the procedure its objectives, risks, consequences and alternatives were discussed with the patient who then elected to proceed. The patient had the opportunity to ask questions and those questions were answered. A physical exam was performed. The heart, lungs, and mental status were examined prior to the procedure and found to be satisfactory for conscious sedation and for the procedure. Conscious sedation was initiated by the physician. Continuous pulse oximetry and blood pressure monitoring were used throughout the procedure. After appropriate pharyngeal anesthesia, the endoscope was passed into the esophagus without difficulty. The  esophagus is normal except for a large hiatal hernia. The scope was passed into the stomach without difficulty. The fundus, body, antrum, pylorus, bulb and postbulbar area are unremarkable. On slow withdrawal of the scope, no abnormality was noted. Retroflexion revealed normal cardia and fundus. She was dilated with a 50 Ni[place] Patel dilator. She tolerated the procedure without complications and will be discharged later today. Specimen None    Complications: None. EBL:  None.     Sue Nowak MD  4/4/2018  12:12 PM

## (undated) DEVICE — Z DISCONTINUED NO SUB IDED SET EXTN W/ 4 W STPCOCK M SPIN LOK 36IN

## (undated) DEVICE — 1200 GUARD II KIT W/5MM TUBE W/O VAC TUBE: Brand: GUARDIAN

## (undated) DEVICE — SOLIDIFIER FLUID 3000 CC ABSORB

## (undated) DEVICE — BW-412T DISP COMBO CLEANING BRUSH: Brand: SINGLE USE COMBINATION CLEANING BRUSH

## (undated) DEVICE — BAG BELONG PT PERS CLEAR HANDL

## (undated) DEVICE — CATH IV AUTOGRD BC BLU 22GA 25 -- INSYTE

## (undated) DEVICE — CANN NASAL O2 CAPNOGRAPHY AD -- FILTERLINE

## (undated) DEVICE — KENDALL RADIOLUCENT FOAM MONITORING ELECTRODE -RECTANGULAR SHAPE: Brand: KENDALL

## (undated) DEVICE — NEEDLE HYPO 18GA L1.5IN PNK S STL HUB POLYPR SHLD REG BVL

## (undated) DEVICE — SET ADMIN 16ML TBNG L100IN 2 Y INJ SITE IV PIGGY BK DISP

## (undated) DEVICE — ENDO CARRY-ON PROCEDURE KIT INCLUDES ENZYMATIC SPONGE, GAUZE, BIOHAZARD LABEL, TRAY, LUBRICANT, DIRTY SCOPE LABEL, WATER LABEL, TRAY, DRAWSTRING PAD, AND DEFENDO 4-PIECE KIT.: Brand: ENDO CARRY-ON PROCEDURE KIT

## (undated) DEVICE — SYRINGE MED 20ML STD CLR PLAS LUERLOCK TIP N CTRL DISP

## (undated) DEVICE — KIT IV STRT W CHLORAPREP PD 1ML